# Patient Record
Sex: FEMALE | Race: WHITE | Employment: OTHER | URBAN - METROPOLITAN AREA
[De-identification: names, ages, dates, MRNs, and addresses within clinical notes are randomized per-mention and may not be internally consistent; named-entity substitution may affect disease eponyms.]

---

## 2017-02-28 PROCEDURE — 86803 HEPATITIS C AB TEST: CPT | Performed by: INTERNAL MEDICINE

## 2018-02-22 PROCEDURE — 88175 CYTOPATH C/V AUTO FLUID REDO: CPT | Performed by: OBSTETRICS & GYNECOLOGY

## 2018-07-31 PROBLEM — Z12.11 COLON CANCER SCREENING: Status: ACTIVE | Noted: 2018-07-31

## 2018-07-31 PROBLEM — Z00.00 HEALTH CARE MAINTENANCE: Status: ACTIVE | Noted: 2018-07-31

## 2020-03-12 PROBLEM — N39.41 URGE INCONTINENCE: Status: ACTIVE | Noted: 2020-03-12

## 2024-04-25 ENCOUNTER — OFFICE VISIT (OUTPATIENT)
Dept: FAMILY MEDICINE CLINIC | Facility: CLINIC | Age: 64
End: 2024-04-25
Payer: COMMERCIAL

## 2024-04-25 ENCOUNTER — LAB ENCOUNTER (OUTPATIENT)
Dept: LAB | Age: 64
End: 2024-04-25
Attending: FAMILY MEDICINE
Payer: COMMERCIAL

## 2024-04-25 VITALS
OXYGEN SATURATION: 98 % | SYSTOLIC BLOOD PRESSURE: 108 MMHG | RESPIRATION RATE: 16 BRPM | WEIGHT: 129 LBS | HEIGHT: 60 IN | BODY MASS INDEX: 25.32 KG/M2 | HEART RATE: 63 BPM | DIASTOLIC BLOOD PRESSURE: 72 MMHG

## 2024-04-25 DIAGNOSIS — Z12.31 ENCOUNTER FOR SCREENING MAMMOGRAM FOR BREAST CANCER: ICD-10-CM

## 2024-04-25 DIAGNOSIS — Z00.00 WELLNESS EXAMINATION: Primary | ICD-10-CM

## 2024-04-25 DIAGNOSIS — E03.8 OTHER SPECIFIED HYPOTHYROIDISM: ICD-10-CM

## 2024-04-25 DIAGNOSIS — Z00.00 LABORATORY EXAMINATION ORDERED AS PART OF A ROUTINE GENERAL MEDICAL EXAMINATION: ICD-10-CM

## 2024-04-25 DIAGNOSIS — E78.2 MIXED HYPERLIPIDEMIA: ICD-10-CM

## 2024-04-25 DIAGNOSIS — Z80.0 FAMILY HISTORY OF COLON CANCER: ICD-10-CM

## 2024-04-25 LAB
ALBUMIN SERPL-MCNC: 3.6 G/DL (ref 3.4–5)
ALBUMIN/GLOB SERPL: 1.2 {RATIO} (ref 1–2)
ALP LIVER SERPL-CCNC: 68 U/L
ALT SERPL-CCNC: 39 U/L
ANION GAP SERPL CALC-SCNC: 1 MMOL/L (ref 0–18)
AST SERPL-CCNC: 20 U/L (ref 15–37)
BASOPHILS # BLD AUTO: 0.03 X10(3) UL (ref 0–0.2)
BASOPHILS NFR BLD AUTO: 0.5 %
BILIRUB SERPL-MCNC: 0.8 MG/DL (ref 0.1–2)
BUN BLD-MCNC: 11 MG/DL (ref 9–23)
CALCIUM BLD-MCNC: 8.6 MG/DL (ref 8.5–10.1)
CHLORIDE SERPL-SCNC: 107 MMOL/L (ref 98–112)
CHOLEST SERPL-MCNC: 116 MG/DL (ref ?–200)
CO2 SERPL-SCNC: 33 MMOL/L (ref 21–32)
CREAT BLD-MCNC: 0.9 MG/DL
EGFRCR SERPLBLD CKD-EPI 2021: 72 ML/MIN/1.73M2 (ref 60–?)
EOSINOPHIL # BLD AUTO: 0.14 X10(3) UL (ref 0–0.7)
EOSINOPHIL NFR BLD AUTO: 2.5 %
ERYTHROCYTE [DISTWIDTH] IN BLOOD BY AUTOMATED COUNT: 12.8 %
FASTING PATIENT LIPID ANSWER: YES
FASTING STATUS PATIENT QL REPORTED: YES
GLOBULIN PLAS-MCNC: 3 G/DL (ref 2.8–4.4)
GLUCOSE BLD-MCNC: 81 MG/DL (ref 70–99)
HCT VFR BLD AUTO: 40.7 %
HDLC SERPL-MCNC: 50 MG/DL (ref 40–59)
HGB BLD-MCNC: 13.6 G/DL
IMM GRANULOCYTES # BLD AUTO: 0.01 X10(3) UL (ref 0–1)
IMM GRANULOCYTES NFR BLD: 0.2 %
LDLC SERPL CALC-MCNC: 55 MG/DL (ref ?–100)
LYMPHOCYTES # BLD AUTO: 2.63 X10(3) UL (ref 1–4)
LYMPHOCYTES NFR BLD AUTO: 46.1 %
MCH RBC QN AUTO: 31.4 PG (ref 26–34)
MCHC RBC AUTO-ENTMCNC: 33.4 G/DL (ref 31–37)
MCV RBC AUTO: 94 FL
MONOCYTES # BLD AUTO: 0.47 X10(3) UL (ref 0.1–1)
MONOCYTES NFR BLD AUTO: 8.2 %
NEUTROPHILS # BLD AUTO: 2.43 X10 (3) UL (ref 1.5–7.7)
NEUTROPHILS # BLD AUTO: 2.43 X10(3) UL (ref 1.5–7.7)
NEUTROPHILS NFR BLD AUTO: 42.5 %
NONHDLC SERPL-MCNC: 66 MG/DL (ref ?–130)
OSMOLALITY SERPL CALC.SUM OF ELEC: 290 MOSM/KG (ref 275–295)
PLATELET # BLD AUTO: 240 10(3)UL (ref 150–450)
POTASSIUM SERPL-SCNC: 3.6 MMOL/L (ref 3.5–5.1)
PROT SERPL-MCNC: 6.6 G/DL (ref 6.4–8.2)
RBC # BLD AUTO: 4.33 X10(6)UL
SODIUM SERPL-SCNC: 141 MMOL/L (ref 136–145)
TRIGL SERPL-MCNC: 44 MG/DL (ref 30–149)
TSI SER-ACNC: 1.1 MIU/ML (ref 0.36–3.74)
VLDLC SERPL CALC-MCNC: 6 MG/DL (ref 0–30)
WBC # BLD AUTO: 5.7 X10(3) UL (ref 4–11)

## 2024-04-25 PROCEDURE — 36415 COLL VENOUS BLD VENIPUNCTURE: CPT

## 2024-04-25 PROCEDURE — 80061 LIPID PANEL: CPT

## 2024-04-25 PROCEDURE — 85025 COMPLETE CBC W/AUTO DIFF WBC: CPT

## 2024-04-25 PROCEDURE — 80053 COMPREHEN METABOLIC PANEL: CPT

## 2024-04-25 PROCEDURE — 99386 PREV VISIT NEW AGE 40-64: CPT | Performed by: FAMILY MEDICINE

## 2024-04-25 PROCEDURE — 84443 ASSAY THYROID STIM HORMONE: CPT

## 2024-04-25 PROCEDURE — 96127 BRIEF EMOTIONAL/BEHAV ASSMT: CPT | Performed by: FAMILY MEDICINE

## 2024-04-25 RX ORDER — ATORVASTATIN CALCIUM 20 MG/1
20 TABLET, FILM COATED ORAL NIGHTLY
COMMUNITY
Start: 2024-03-23 | End: 2024-04-25

## 2024-04-25 RX ORDER — SODIUM FLUORIDE 1.1 G/100G
CREAM ORAL AS DIRECTED
COMMUNITY

## 2024-04-25 RX ORDER — ATORVASTATIN CALCIUM 20 MG/1
20 TABLET, FILM COATED ORAL NIGHTLY
Qty: 90 TABLET | Refills: 1 | Status: SHIPPED | OUTPATIENT
Start: 2024-04-25

## 2024-04-25 NOTE — PATIENT INSTRUCTIONS
Health Screening Guidelines, Women Ages 50 to 64   Screening tests and health counseling are a key part of managing your health. A screening test is done to find disorders or diseases in people who don't have any symptoms. Screening tests are not used to diagnose. They are used to find out if more testing is needed. The goal may be to find a disease early so it can be treated with more success. Or the goal may be to find a disease early so you can make lifestyle changes. You may need regular checkups to help you reduce your risk of disease.   Below are guidelines for women ages 50 to 64. Talk with your healthcare provider. Make sure you’re up-to-date on what you need.   Gendered terms are used here to talk about anatomy and health risk. Please use this information in a way that works best for you and your provider as you talk about your care.   Screening  Who needs it  How often    Type 2 diabetes or prediabetes  All women in this age group who are overweight or obese, or had gestational diabetes  At least every 3 years    Type 2 diabetes All women with prediabetes  Every year   Unhealthy alcohol use  All women in this age group  At routine exams   Blood pressure All women in this age group  Once a year if your blood pressure is normal. Normal blood pressure is less than 120/80 mm Hg. If your blood pressure is higher than this, follow the advice of your healthcare provider.    Breast cancer All women in this age group at average risk  Advice varies. Talk with your provider to find out what's best for you.   All women should know how their breasts normally look and feel. They should know the benefits and risks of breast cancer screening with mammograms.    Cervical cancer All women in this age group, unless they have had a complete hysterectomy  Pap test every 3 years or Pap test with high-risk human papillomavirus (hrHPV) test every 5 years    Chlamydia Women who are sexually active and at higher risk of infection   At yearly routine exams    Colorectal cancer All women in this age group at average risk  Talk with your healthcare provider about which test below is right for you:   Flexible sigmoidoscopy every 5 years  Colonoscopy every 10 years  CT colonography (virtual colonoscopy) every 5 years  Yearly fecal occult blood test  Yearly fecal immunochemical test (FIT)  Stool DNA test every 3 years  Double contrast barium enema every 5 years  If you have a test that is not a colonoscopy and have an abnormal test result, you will need a colonoscopy.   You may need to be screened more or less often. This is based on personal or family health history. Talk with your healthcare provider.    Depression All women in this age group  At routine exams   Gonorrhea Sexually active women who are at higher risk of infection  At yearly routine exams    Hepatitis C All women in this age group  At routine exams   High cholesterol or triglycerides  All women in this age group who are at risk for coronary artery disease  At least every 5 years. Talk with your healthcare provider about your risk.    HIV All women in this age group  At least 1 time. Get tested yearly if you're at high risk.    Lung cancer All women in this age group who are in fairly good health, are at higher risk for lung cancer, and who:       Smoke or quit in the past 15 years  Have a 20-pack- per year smoking history (1 pack a day for 20 years or 2 packs a day for 10 years)      Expert groups vary in their advice. Talk with your healthcare provider.  Yearly lung cancer screening with a low-dose CT scan (LDCT). Talk with your healthcare provider.    Obesity All women in this age group  At yearly routine exams    Osteoporosis Women who are postmenopausal  Talk with your healthcare provider    Syphilis Women who are at higher risk of infection. Talk with your healthcare provider.  At routine exams   Tuberculosis Women who are at higher risk of infection  Talk with your healthcare  provider    Vision All women in this age group  Talk with your healthcare provider    Health counseling Who needs it How often   BRCA gene mutation testing for breast and ovarian cancer susceptibility  Women who are at higher risk of having this gene mutation. Talk with your healthcare provider.  When your risk is known    Breast cancer and chemoprevention  Women who are at high risk for breast cancer. Talk with your healthcare provider.  When your risk is known    Diet and exercise Women who are overweight or obese  When diagnosed, and then at routine exams    Sexually transmitted infection (STI) prevention  Women who are at higher risk of infection. Talk with your healthcare provider.  At routine exams   Use of tobacco and the health effects it can cause  All women in this age group  Every exam   [employer] last reviewed this educational content on 7/1/2022 © 2000-2023 [employer]. All rights reserved. This information is not intended as a substitute for professional medical care. Always follow your healthcare professional's instructions.

## 2024-04-25 NOTE — PROGRESS NOTES
HPI:     Ronda Beaver is a 64 year old female who presents for an Annual Health Visit.      No acute concerns. Has h/o HLD on atorvastatin, hypothyroidism on levothyroxine. Reports conditions stable, denies any sx.     Allergies:   No Known Allergies    CURRENT MEDICATIONS:   Current Outpatient Medications   Medication Sig Dispense Refill    Cholecalciferol 50 MCG (2000 UT) Oral Cap Take 2,000 Units by mouth daily.      DENTA 5000 PLUS 1.1 % Dental Cream As Directed.      atorvastatin 20 MG Oral Tab Take 1 tablet (20 mg total) by mouth nightly. 90 tablet 1    levothyroxine 50 MCG Oral Tab TAKE 1 TABLET (50 MCG TOTAL) BY MOUTH DAILY. 90 tablet 3    Multiple Vitamins-Minerals (MULTI-VITAMIN/MINERALS) Oral Tab Take 1 tablet by mouth daily.        MEDICAL INFORMATION:   History reviewed. No pertinent past medical history.   Past Surgical History:   Procedure Laterality Date    Colonoscopy  11/26/2012    Total abdominal hysterectomy      Tubal ligation        Family History   Problem Relation Age of Onset    Colon Cancer Mother     Cancer Brother         Pancreatic Ca      SOCIAL HISTORY:   Social History     Socioeconomic History    Marital status:    Tobacco Use    Smoking status: Never    Smokeless tobacco: Never   Substance and Sexual Activity    Alcohol use: No    Drug use: No     Social History     Social History Narrative    Not on file        REVIEW OF SYSTEMS:     Constitutional: negative  Eyes: negative  ENT: negative  Respiratory: negative  Cardiovascular: negative  Gastrointestinal: negative  Integument/Breast: negative  Genitourinary: negative  Heme/Lymph: negative  Musculoskeletal: negative  Neurological: negative  Psych: negative  Endocrine: negative  Allergic/Immune: negative        EXAM:   /72   Pulse 63   Resp 16   Ht 5' (1.524 m)   Wt 129 lb (58.5 kg)   SpO2 98%   BMI 25.19 kg/m²    Wt Readings from Last 6 Encounters:   04/25/24 129 lb (58.5 kg)   02/14/22 135 lb (61.2 kg)    02/09/22 138 lb (62.6 kg)   01/06/22 134 lb (60.8 kg)   01/14/21 136 lb (61.7 kg)   11/14/19 133 lb (60.3 kg)     Body mass index is 25.19 kg/m².    General: alert, appears stated age, and cooperative  Head: Normocephalic, without obvious abnormality, atraumatic  Eyes: negative  Ears: normal TM's and external ear canals both ears  Nose: Nares normal. Septum midline. Mucosa normal. No drainage or sinus tenderness.  Throat: lips, mucosa, and tongue normal; teeth and gums normal  Neck: no adenopathy, supple, symmetrical, trachea midline, and thyroid not enlarged, symmetric, no tenderness/mass/nodules  Heart: S1, S2 normal, no murmur, click, rub or gallop, regular rate and rhythm  Lungs: clear to auscultation bilaterally  Breast:  deferred  Abdomen: soft, non-tender; bowel sounds normal; no masses,  no organomegaly  Pelvic: deferred  Back: negative  Extremities: extremities normal, atraumatic, no cyanosis or edema  Pulses: 2+ and symmetric  Skin: Skin color, texture, turgor normal. No rashes or lesions  Lymph Nodes:  No LAD  Neurologic: Grossly normal      ASSESSMENT AND PLAN:   Ronda was seen today for [facility] [place] care and well adult.    Diagnoses and all orders for this visit:    Wellness examination  -Immunizations: Recommend shingrex   -Metabolic: BMI 25. BP wnl. Due for annual labs   -Cancer screening: mammo ordered.    -Communicable disease: low risk   -Mental health: no concerns   -Other preventative: follow with dentistry and optometry.   -Lifestyle: Follow a well balanced healthy diet with emphasis on fruits, vegetables, whole grains, lean meats. Limit processed and junk foods. Aim for at least 150 minutes of moderate intensity exercise weekly. Make sure you are staying adequately hydrated. Aim to get 7-9 hours of sleep nightly.     Laboratory examination ordered as part of a routine general medical examination  -     CBC With Differential With Platelet; Future  -     Comp Metabolic Panel (14); Future  -      Lipid Panel; Future  -     TSH W Reflex To Free T4; Future    Encounter for screening mammogram for breast cancer  -     [facility] TOMMY 2D+3D SCREENING BILAT (CPT=77067/48231); Future    Other specified hypothyroidism  Stable, CPM. Due for labs.     Family history of colon cancer  -     Surgery Referral - In Network    Mixed hyperlipidemia  Due for labs. Continue statin, low fat diet, regular exercise.   -     atorvastatin 20 MG Oral Tab; Take 1 tablet (20 mg total) by mouth nightly.        Patient Instructions     Health Screening Guidelines, Women Ages 50 to 64   Screening tests and health counseling are a key part of managing your health. A screening test is done to find disorders or diseases in people who don't have any symptoms. Screening tests are not used to diagnose. They are used to find out if more testing is needed. The goal may be to find a disease early so it can be treated with more success. Or the goal may be to find a disease early so you can make lifestyle changes. You may need regular checkups to help you reduce your risk of disease.   Below are guidelines for women ages 50 to 64. Talk with your healthcare provider. Make sure you’re up-to-date on what you need.   Gendered terms are used here to talk about anatomy and health risk. Please use this information in a way that works best for you and your provider as you talk about your care.   Screening  Who needs it  How often    Type 2 diabetes or prediabetes  All women in this age group who are overweight or obese, or had gestational diabetes  At least every 3 years    Type 2 diabetes All women with prediabetes  Every year   Unhealthy alcohol use  All women in this age group  At routine exams   Blood pressure All women in this age group  Once a year if your blood pressure is normal. Normal blood pressure is less than 120/80 mm Hg. If your blood pressure is higher than this, follow the advice of your healthcare provider.    Breast cancer All women in this  age group at average risk  Advice varies. Talk with your provider to find out what's best for you.   All women should know how their breasts normally look and feel. They should know the benefits and risks of breast cancer screening with mammograms.    Cervical cancer All women in this age group, unless they have had a complete hysterectomy  Pap test every 3 years or Pap test with high-risk human papillomavirus (hrHPV) test every 5 years    Chlamydia Women who are sexually active and at higher risk of infection  At yearly routine exams    Colorectal cancer All women in this age group at average risk  Talk with your healthcare provider about which test below is right for you:   Flexible sigmoidoscopy every 5 years  Colonoscopy every 10 years  CT colonography (virtual colonoscopy) every 5 years  Yearly fecal occult blood test  Yearly fecal immunochemical test (FIT)  Stool DNA test every 3 years  Double contrast barium enema every 5 years  If you have a test that is not a colonoscopy and have an abnormal test result, you will need a colonoscopy.   You may need to be screened more or less often. This is based on personal or family health history. Talk with your healthcare provider.    Depression All women in this age group  At routine exams   Gonorrhea Sexually active women who are at higher risk of infection  At yearly routine exams    Hepatitis C All women in this age group  At routine exams   High cholesterol or triglycerides  All women in this age group who are at risk for coronary artery disease  At least every 5 years. Talk with your healthcare provider about your risk.    HIV All women in this age group  At least 1 time. Get tested yearly if you're at high risk.    Lung cancer All women in this age group who are in fairly good health, are at higher risk for lung cancer, and who:       Smoke or quit in the past 15 years  Have a 20-pack- per year smoking history (1 pack a day for 20 years or 2 packs a day for 10  years)      Expert groups vary in their advice. Talk with your healthcare provider.  Yearly lung cancer screening with a low-dose CT scan (LDCT). Talk with your healthcare provider.    Obesity All women in this age group  At yearly routine exams    Osteoporosis Women who are postmenopausal  Talk with your healthcare provider    Syphilis Women who are at higher risk of infection. Talk with your healthcare provider.  At routine exams   Tuberculosis Women who are at higher risk of infection  Talk with your healthcare provider    Vision All women in this age group  Talk with your healthcare provider    Health counseling Who needs it How often   BRCA gene mutation testing for breast and ovarian cancer susceptibility  Women who are at higher risk of having this gene mutation. Talk with your healthcare provider.  When your risk is known    Breast cancer and chemoprevention  Women who are at high risk for breast cancer. Talk with your healthcare provider.  When your risk is known    Diet and exercise Women who are overweight or obese  When diagnosed, and then at routine exams    Sexually transmitted infection (STI) prevention  Women who are at higher risk of infection. Talk with your healthcare provider.  At routine exams   Use of tobacco and the health effects it can cause  All women in this age group  Every exam   [employer] last reviewed this educational content on 7/1/2022 © 2000-2023 [employer]. All rights reserved. This information is not intended as a substitute for professional medical care. Always follow your healthcare professional's instructions.          The patient indicates understanding of these issues and agrees to the plan.    Problem List:  Patient Active Problem List   Diagnosis    Hyperlipidemia    Hypothyroidism    Non-toxic multinodular goiter    Postmenopausal status    Visit for screening mammogram    Health care maintenance    Colon cancer screening    Urge incontinence       Gregory  Dakotah Ortez MD  4/25/2024  3:03 PM

## 2024-05-07 ENCOUNTER — HOSPITAL ENCOUNTER (OUTPATIENT)
Dept: MAMMOGRAPHY | Age: 64
Discharge: HOME | End: 2024-05-07
Attending: FAMILY MEDICINE
Payer: COMMERCIAL

## 2024-05-07 DIAGNOSIS — Z12.31 ENCOUNTER FOR SCREENING MAMMOGRAM FOR BREAST CANCER: ICD-10-CM

## 2024-05-07 PROCEDURE — 77063 BREAST TOMOSYNTHESIS BI: CPT | Performed by: FAMILY MEDICINE

## 2024-05-07 PROCEDURE — 77067 SCR MAMMO BI INCL CAD: CPT | Performed by: FAMILY MEDICINE

## 2024-05-30 ENCOUNTER — OFFICE VISIT (OUTPATIENT)
Facility: LOCATION | Age: 64
End: 2024-05-30

## 2024-05-30 VITALS — TEMPERATURE: 97 F | HEART RATE: 67 BPM

## 2024-05-30 DIAGNOSIS — Z12.11 ENCOUNTER FOR SCREENING COLONOSCOPY: Primary | ICD-10-CM

## 2024-05-30 PROCEDURE — S0285 CNSLT BEFORE SCREEN COLONOSC: HCPCS

## 2024-05-30 RX ORDER — POLYETHYLENE GLYCOL 3350, SODIUM CHLORIDE, SODIUM BICARBONATE, POTASSIUM CHLORIDE 420; 11.2; 5.72; 1.48 G/4L; G/4L; G/4L; G/4L
POWDER, FOR SOLUTION ORAL
Qty: 1 EACH | Refills: 0 | Status: SHIPPED | OUTPATIENT
Start: 2024-05-30

## 2024-05-30 NOTE — PROGRESS NOTES
New Patient Visit Note       Active Problems      1. Encounter for screening colonoscopy        Chief Complaint   Chief Complaint   Patient presents with    Colonoscopy     NP- Cscope Consult- denies family hx of colon cancer, denies symptoms, last cscope in 2012       PCP  Gregory Ortez MD     History of Present Illness   Ronda Beaver is a 64 year old female who presents for evaluation for colonoscopy. The patient has had previous colonoscopy. Last colonoscopy per chart review showed March 2022 which showed tubular adenoma. The patient's family history is negative for colon and rectal cancer  .The patient denies nausea, vomiting, abdominal pain or cramping or bloating, diarrhea, constipation, bright red blood in the stool, dark tarry stools, other recent changes in bowel habits, or recent weight loss.    The patient's past medical history significant for hyperlipemia, hypothyroidism. She has a past surgical history significant for total abdominal hysterectomy. She is not taking any blood thinning medications.     Past Medical / Surgical / Social / Family History    The past medical history, past surgical history, family history, social history, allergies, and medications have been reviewed by me today.    Current Outpatient Medications on File Prior to Visit   Medication Sig    Cholecalciferol 50 MCG (2000 UT) Oral Cap Take 2,000 Units by mouth daily.    DENTA 5000 PLUS 1.1 % Dental Cream As Directed.    atorvastatin 20 MG Oral Tab Take 1 tablet (20 mg total) by mouth nightly.    levothyroxine 50 MCG Oral Tab TAKE 1 TABLET (50 MCG TOTAL) BY MOUTH DAILY.    Multiple Vitamins-Minerals (MULTI-VITAMIN/MINERALS) Oral Tab Take 1 tablet by mouth daily.     No current facility-administered medications on file prior to visit.        Review of Systems  Constitutional: No Fever, No Chills, No Diaphoresis, No fatigue, No weight loss, No anorexia  Eyes: No burry vision, No icterus  ENT: No tinnitus, No  rhinorrhea, No dysphagia, No odynophagia  Respiratory: No dyspnea, No wheezing  Cardiovascular: No chest pain, No palpitations, No leg swelling  Gastrointestinal: No abdominal pain, No abdominal distention, No bloating, No nausea, No vomiting, No diarrhea, No constipation, No melena, No hematochezia  Endocrine: No polydipsia, No polyuria  Genitoruinary: No dysuria, No Hematuria  Musculoskeletal: No joint pain, No muscle pain  Neurologic: No dizzyness, No syncope, No headaches, No numbness  Hematologic: No easy bruising, No easy bleeding  Psychiatric: No anxiety, No depression      Physical Findings   Pulse 67   Temp 97.4 °F (36.3 °C) (Temporal)     Constitutional: Well nourished, well kempt  Eyes: EOMI, no scleral icterus  ENT: Nares moist, oropharynx clear  Neck: Supple, no tracheal deviation   Cardiac: Normal rate, no JVD  Respiratory: No respiratory distress, No audible wheezing  Abdominal: Soft, Nontender, Nondistended  Muskuloskeletal: Normal gait, Full ROM in all extremities  Lymphatic: no cervical or supraclavicular adenopathy  Skin: No rashes,  No lesions  Neurologic: No focal deficits  Psych: Appropriate mood and affect, oriented x3            Assessment   1. Encounter for screening colonoscopy          Plan   The patient is 64 year old and has had a colonoscopy. The patient's family history is negative for colon or rectal cancer . The patient does not have gastrointestinal symptoms.     Recommend proceeding with colonoscopy with Dr. Anton Hagen.     The benefits of colonoscopy, including detection of cancer and polyp removal prior to progression to cancer were discussed. The details of the procedure which include navigation of the colonoscope through the anus, rectum, and colon to evaluate the mucosa and removal of any polyps encountered were discussed as well. The risks of colonoscopy were discussed with the patient and include but are not limited to post-procedure bleeding, infection, colorectal  perforation, splenic injury, missed polyps, need for additional surgeries or interventions. All questions were answered and the patient voiced understanding and agreed to proceed with colonoscopy.    Estella Archer PA-C

## 2024-06-24 DIAGNOSIS — E03.8 OTHER SPECIFIED HYPOTHYROIDISM: ICD-10-CM

## 2024-06-24 RX ORDER — LEVOTHYROXINE SODIUM 0.05 MG/1
TABLET ORAL
Qty: 90 TABLET | Refills: 1 | Status: SHIPPED | OUTPATIENT
Start: 2024-06-24 | End: 2024-06-24

## 2024-06-24 RX ORDER — LEVOTHYROXINE SODIUM 0.05 MG/1
TABLET ORAL
Qty: 90 TABLET | Refills: 1 | Status: SHIPPED | OUTPATIENT
Start: 2024-06-24

## 2024-06-24 NOTE — TELEPHONE ENCOUNTER
Patient called and is requesting the refill be sent to [facility] and NOT [facility] please.

## 2024-06-24 NOTE — TELEPHONE ENCOUNTER
Ronda Beaver requesting Medication Refill for: levothyroxine 50 MCG Oral Tab     Preferred Pharmacy:   [id number] - [place]   LOV: 4/25/2024     Last Refill date:   Next Scheduled appointment:     Per pt only have 7 pills left.

## 2024-07-11 ENCOUNTER — TELEPHONE (OUTPATIENT)
Facility: LOCATION | Age: 64
End: 2024-07-11

## 2024-07-11 DIAGNOSIS — Z12.11 ENCOUNTER FOR SCREENING COLONOSCOPY: Primary | ICD-10-CM

## 2024-07-22 ENCOUNTER — TELEPHONE (OUTPATIENT)
Facility: LOCATION | Age: 64
End: 2024-07-22

## 2024-07-22 NOTE — TELEPHONE ENCOUNTER
No prior authorization required for cpt code 32929. Ref #: [id number]. Spoke with King MICHELLE @ 11:51am on 7/22

## 2024-08-06 ENCOUNTER — HOSPITAL ENCOUNTER (OUTPATIENT)
Facility: HOSPITAL | Age: 64
Setting detail: HOSPITAL OUTPATIENT SURGERY
Discharge: HOME | End: 2024-08-06
Attending: STUDENT IN AN ORGANIZED HEALTH CARE EDUCATION/TRAINING PROGRAM | Admitting: STUDENT IN AN ORGANIZED HEALTH CARE EDUCATION/TRAINING PROGRAM
Payer: COMMERCIAL

## 2024-08-06 ENCOUNTER — ANESTHESIA EVENT (OUTPATIENT)
Dept: ENDOSCOPY | Facility: HOSPITAL | Age: 64
End: 2024-08-06
Payer: COMMERCIAL

## 2024-08-06 ENCOUNTER — ANESTHESIA (OUTPATIENT)
Dept: ENDOSCOPY | Facility: HOSPITAL | Age: 64
End: 2024-08-06
Payer: COMMERCIAL

## 2024-08-06 VITALS
DIASTOLIC BLOOD PRESSURE: 66 MMHG | HEART RATE: 59 BPM | RESPIRATION RATE: 16 BRPM | BODY MASS INDEX: 25.32 KG/M2 | SYSTOLIC BLOOD PRESSURE: 90 MMHG | HEIGHT: 60 IN | WEIGHT: 129 LBS | OXYGEN SATURATION: 98 % | TEMPERATURE: 97 F

## 2024-08-06 DIAGNOSIS — Z12.11 ENCOUNTER FOR SCREENING COLONOSCOPY: ICD-10-CM

## 2024-08-06 PROBLEM — Z86.0100 HISTORY OF COLON POLYPS: Status: ACTIVE | Noted: 2024-08-06

## 2024-08-06 PROBLEM — Z86.010 HISTORY OF COLON POLYPS: Status: ACTIVE | Noted: 2024-08-06

## 2024-08-06 PROBLEM — Z80.0 FAMILY HISTORY OF COLON CANCER IN MOTHER: Status: ACTIVE | Noted: 2024-08-06

## 2024-08-06 PROCEDURE — 45378 DIAGNOSTIC COLONOSCOPY: CPT | Performed by: STUDENT IN AN ORGANIZED HEALTH CARE EDUCATION/TRAINING PROGRAM

## 2024-08-06 PROCEDURE — 0DJD8ZZ INSPECTION OF LOWER INTESTINAL TRACT, VIA NATURAL OR ARTIFICIAL OPENING ENDOSCOPIC: ICD-10-PCS | Performed by: STUDENT IN AN ORGANIZED HEALTH CARE EDUCATION/TRAINING PROGRAM

## 2024-08-06 RX ORDER — HYDROMORPHONE HYDROCHLORIDE 1 MG/ML
0.6 INJECTION, SOLUTION INTRAMUSCULAR; INTRAVENOUS; SUBCUTANEOUS EVERY 5 MIN PRN
Status: DISCONTINUED | OUTPATIENT
Start: 2024-08-06 | End: 2024-08-06

## 2024-08-06 RX ORDER — LIDOCAINE HYDROCHLORIDE 10 MG/ML
INJECTION, SOLUTION EPIDURAL; INFILTRATION; INTRACAUDAL; PERINEURAL AS NEEDED
Status: DISCONTINUED | OUTPATIENT
Start: 2024-08-06 | End: 2024-08-06 | Stop reason: SURG

## 2024-08-06 RX ORDER — HYDROMORPHONE HYDROCHLORIDE 1 MG/ML
0.2 INJECTION, SOLUTION INTRAMUSCULAR; INTRAVENOUS; SUBCUTANEOUS EVERY 5 MIN PRN
Status: DISCONTINUED | OUTPATIENT
Start: 2024-08-06 | End: 2024-08-06

## 2024-08-06 RX ORDER — HYDROMORPHONE HYDROCHLORIDE 1 MG/ML
0.4 INJECTION, SOLUTION INTRAMUSCULAR; INTRAVENOUS; SUBCUTANEOUS EVERY 5 MIN PRN
Status: DISCONTINUED | OUTPATIENT
Start: 2024-08-06 | End: 2024-08-06

## 2024-08-06 RX ORDER — SODIUM CHLORIDE, SODIUM LACTATE, POTASSIUM CHLORIDE, CALCIUM CHLORIDE 600; 310; 30; 20 MG/100ML; MG/100ML; MG/100ML; MG/100ML
INJECTION, SOLUTION INTRAVENOUS CONTINUOUS
Status: DISCONTINUED | OUTPATIENT
Start: 2024-08-06 | End: 2024-08-06

## 2024-08-06 RX ORDER — PROCHLORPERAZINE EDISYLATE 5 MG/ML
5 INJECTION INTRAMUSCULAR; INTRAVENOUS EVERY 8 HOURS PRN
Status: DISCONTINUED | OUTPATIENT
Start: 2024-08-06 | End: 2024-08-06

## 2024-08-06 RX ORDER — NALOXONE HYDROCHLORIDE 0.4 MG/ML
0.08 INJECTION, SOLUTION INTRAMUSCULAR; INTRAVENOUS; SUBCUTANEOUS AS NEEDED
Status: DISCONTINUED | OUTPATIENT
Start: 2024-08-06 | End: 2024-08-06

## 2024-08-06 RX ORDER — HYDROCODONE BITARTRATE AND ACETAMINOPHEN 5; 325 MG/1; MG/1
2 TABLET ORAL ONCE AS NEEDED
Status: DISCONTINUED | OUTPATIENT
Start: 2024-08-06 | End: 2024-08-06

## 2024-08-06 RX ORDER — ONDANSETRON 2 MG/ML
4 INJECTION INTRAMUSCULAR; INTRAVENOUS EVERY 6 HOURS PRN
Status: DISCONTINUED | OUTPATIENT
Start: 2024-08-06 | End: 2024-08-06

## 2024-08-06 RX ORDER — HYDROCODONE BITARTRATE AND ACETAMINOPHEN 5; 325 MG/1; MG/1
1 TABLET ORAL ONCE AS NEEDED
Status: DISCONTINUED | OUTPATIENT
Start: 2024-08-06 | End: 2024-08-06

## 2024-08-06 RX ORDER — ACETAMINOPHEN 500 MG
1000 TABLET ORAL ONCE AS NEEDED
Status: DISCONTINUED | OUTPATIENT
Start: 2024-08-06 | End: 2024-08-06

## 2024-08-06 RX ADMIN — SODIUM CHLORIDE, SODIUM LACTATE, POTASSIUM CHLORIDE, CALCIUM CHLORIDE: 600; 310; 30; 20 INJECTION, SOLUTION INTRAVENOUS at 07:39:00

## 2024-08-06 RX ADMIN — LIDOCAINE HYDROCHLORIDE 50 MG: 10 INJECTION, SOLUTION EPIDURAL; INFILTRATION; INTRACAUDAL; PERINEURAL at 07:10:00

## 2024-08-06 NOTE — H&P
New Patient Visit Note       Active Problems      1. Encounter for screening colonoscopy        Chief Complaint   No chief complaint on file.      PCP  Gregory Ortez MD     History of Present Illness   Ronda Beaver is a 64 year old female who presents for evaluation for colonoscopy. The patient has had previous colonoscopy. Last colonoscopy per chart review showed March 2022 which showed tubular adenoma. The patient's family history is negative for colon and rectal cancer  .The patient denies nausea, vomiting, abdominal pain or cramping or bloating, diarrhea, constipation, bright red blood in the stool, dark tarry stools, other recent changes in bowel habits, or recent weight loss.    The patient's past medical history significant for hyperlipemia, hypothyroidism. She has a past surgical history significant for total abdominal hysterectomy. She is not taking any blood thinning medications.     Past Medical / Surgical / Social / Family History    The past medical history, past surgical history, family history, social history, allergies, and medications have been reviewed by me today.    No current facility-administered medications on file prior to encounter.     Current Outpatient Medications on File Prior to Encounter   Medication Sig    PEG 3350-KCl-Na Bicarb-NaCl (TRILYTE) 420 g Oral Recon Soln .trily    DENTA 5000 PLUS 1.1 % Dental Cream As Directed.    atorvastatin 20 MG Oral Tab Take 1 tablet (20 mg total) by mouth nightly.        Review of Systems  Constitutional: No Fever, No Chills, No Diaphoresis, No fatigue, No weight loss, No anorexia  Eyes: No burry vision, No icterus  ENT: No tinnitus, No rhinorrhea, No dysphagia, No odynophagia  Respiratory: No dyspnea, No wheezing  Cardiovascular: No chest pain, No palpitations, No leg swelling  Gastrointestinal: No abdominal pain, No abdominal distention, No bloating, No nausea, No vomiting, No diarrhea, No constipation, No melena, No  hematochezia  Endocrine: No polydipsia, No polyuria  Genitoruinary: No dysuria, No Hematuria  Musculoskeletal: No joint pain, No muscle pain  Neurologic: No dizzyness, No syncope, No headaches, No numbness  Hematologic: No easy bruising, No easy bleeding  Psychiatric: No anxiety, No depression      Physical Findings   /70 (BP Location: Left arm)   Pulse 59   Temp 97.4 °F (36.3 °C) (Temporal)   Resp 15   Ht 60\"   Wt 129 lb (58.5 kg)   SpO2 100%   BMI 25.19 kg/m²     Constitutional: Well nourished, well kempt  Eyes: EOMI, no scleral icterus  ENT: Nares moist, oropharynx clear  Neck: Supple, no tracheal deviation   Cardiac: Normal rate, no JVD  Respiratory: No respiratory distress, No audible wheezing  Abdominal: Soft, Nontender, Nondistended  Muskuloskeletal: Normal gait, Full ROM in all extremities  Lymphatic: no cervical or supraclavicular adenopathy  Skin: No rashes,  No lesions  Neurologic: No focal deficits  Psych: Appropriate mood and affect, oriented x3            Assessment   1. Encounter for screening colonoscopy          Plan   The patient is 64 year old and has had a colonoscopy. The patient's family history is negative for colon or rectal cancer . The patient does not have gastrointestinal symptoms.     Recommend proceeding with colonoscopy with Dr. Anton Hagen.     The benefits of colonoscopy, including detection of cancer and polyp removal prior to progression to cancer were discussed. The details of the procedure which include navigation of the colonoscope through the anus, rectum, and colon to evaluate the mucosa and removal of any polyps encountered were discussed as well. The risks of colonoscopy were discussed with the patient and include but are not limited to post-procedure bleeding, infection, colorectal perforation, splenic injury, missed polyps, need for additional surgeries or interventions. All questions were answered and the patient voiced understanding and agreed to proceed  with colonoscopy.    Estella Archer PA-C    Patient was evaluated by Estella Wilson PA-C and scheduled for surveillance colonoscopy today.  I met the patient in the preprocedure holding area.  Patient has a history of colon polyps. Patient's most recent colonoscopy was in March 2022 where two small polyps were found per patient. Patient's mother has a history of colon cancer diagnosed in her 40s.  Patient has never underwent genetic testing.  She denies any rectal bleeding, change in bowel habits, anemia or unintentional weight loss.  He does not take any blood thinners.  He completed the bowel prep as instructed.     The details of the colonoscopy procedure were discussed including the risks, benefits and alternatives.  Patient expressed understanding was agreeable to proceed as scheduled today.  Consent was signed.  All questions answered.     Anton Hagen MD  [facility]

## 2024-08-06 NOTE — OPERATIVE REPORT
[facility]  Operative Note    Ronda Beaver Location: OR   CSN [id number] MRN [id number]    1960 Age 64 year old   Admission Date 2024 Operation Date 2024   Attending Physician Anton Hagen MD Operating Physician Anton Hagen MD   PCP Gregory Ortez MD          Patient Name: Ronda Beaver    Preoperative Diagnosis: History of colon polyps, family history of colon cancer in mother    Postoperative Diagnosis:   Diverticulosis    Primary Surgeon: Anton Hagen MD    Anesthesia: MAC    Procedures: Colonoscopy to the cecum    Specimen:   None    Estimated Blood Loss: None    Complications: None immediate    Condition: Good    Indications for Surgery:   Patient was evaluated by Estella Archer PA-C on 2024 and scheduled for surveillance colonoscopy today.  I met the patient in the preprocedure holding area.  Patient has a history of colon polyps. Patient's most recent colonoscopy was in 2022 where two small polyps were found per patient. Patient's mother has a history of colon cancer diagnosed in her 40s.  Patient has never underwent genetic testing.  She denies any rectal bleeding, change in bowel habits, anemia or unintentional weight loss.  He does not take any blood thinners.  He completed the bowel prep as instructed.     The details of the colonoscopy procedure were discussed including the risks, benefits and alternatives.  Patient expressed understanding was agreeable to proceed as scheduled today.  Consent was signed.  All questions answered.    Surgical Findings:   The quality of the bowel prep was excellent.  Few, small sigmoid diverticula.  Otherwise, the entire colon and rectum appeared pink and healthy without any masses, polyps or inflammatory changes.    Description of Procedure:   The patient was taken to the endoscopy suite and positioned in the left lateral decubitus position with knees flexed. Monitored anesthesia care was administered. A time-out  was performed.    The perineum and perianal skin were examined. A digital rectal examination was performed.  These were both normal. A well-lubricated adult colonoscope was then inserted and carefully navigated to the cecum. CO2 insufflation was used throughout the procedure. Advancement was accomplished with relative ease though that the colon was tortuous.  Advancement was aided by stiffening of the colonoscope and abdominal pressure.  The appendiceal orifice and ileocecal valve were identified and photographed. The terminal ileum was not intubated. The scope was then withdrawn as the mucosa was circumferentially examined.     There were a few small sigmoid diverticula. Otherwise, the entire colon and rectum appeared pink and healthy without any masses, polyps or inflammatory changes. In the rectum, the scope was retroflexed to evaluate the anorectal junction.  The scope was straightened and excess gas was suctioned from the colon and the scope removed, terminating the procedure.    Anesthesia was terminated and the patient transported to the recovery unit in good condition. The patient tolerated the procedure well without apparent intraoperative complication.    Follow up:   Patient will need next colonoscopy in 5 years given patient's mother's history of colon cancer.       Anton Hagen MD  8/6/2024  7:39 AM

## 2024-08-06 NOTE — ANESTHESIA POSTPROCEDURE EVALUATION
[facility]    Ronda Beaver Patient Status:  Hospital Outpatient Surgery   Age/Gender 64 year old female MRN [id number]   Location [facility] Attending Anton Hagen MD   Hosp Day # 0 PCP Gregory Ortez MD       Anesthesia Post-op Note    COLONOSCOPY    Procedure Summary       Date: 08/06/24 Room / Location:  ENDOSCOPY 02 /  ENDOSCOPY    Anesthesia Start: 0706 Anesthesia Stop:     Procedure: COLONOSCOPY Diagnosis:       Encounter for screening colonoscopy      (Diverticulosis)    Surgeons: Anton Hagen MD Anesthesiologist: Arden Montoya MD    Anesthesia Type: MAC ASA Status: 2            Anesthesia Type: MAC    Vitals Value Taken Time   BP 98/63 08/06/24 0739   Temp na 08/06/24 0739   Pulse 60 08/06/24 0739   Resp 16 08/06/24 0739   SpO2 98 08/06/24 0739       Patient Location: Endoscopy    Anesthesia Type: MAC    Airway Patency: patent    Postop Pain Control: adequate    Mental Status: mildly sedated but able to meaningfully participate in the post-anesthesia evaluation    Nausea/Vomiting: none    Cardiopulmonary/Hydration status: stable euvolemic    Complications: no apparent anesthesia related complications    Postop vital signs: stable    Dental Exam: Unchanged from Preop    Patient to be discharged from PACU when criteria met.

## 2024-08-06 NOTE — ANESTHESIA PREPROCEDURE EVALUATION
PRE-OP EVALUATION    Patient Name: Ronda Beaver    Admit Diagnosis: Encounter for screening colonoscopy [Z12.11]    Pre-op Diagnosis: Encounter for screening colonoscopy [Z12.11]    COLONOSCOPY    Anesthesia Procedure: COLONOSCOPY    Surgeons and Role:     * Anton Hagen MD - Primary    Pre-op vitals reviewed.  Temp: 97.4 °F (36.3 °C)  Pulse: 59  Resp: 15  BP: 128/70  SpO2: 100 %  Body mass index is 25.19 kg/m².    Current medications reviewed.  Hospital Medications:  • lactated ringers infusion   Intravenous Continuous       Outpatient Medications:     Medications Prior to Admission   Medication Sig Dispense Refill Last Dose   • levothyroxine 50 MCG Oral Tab TAKE 1 TABLET (50 MCG TOTAL) BY MOUTH DAILY. 90 tablet 1 8/5/2024   • PEG 3350-KCl-Na Bicarb-NaCl (TRILYTE) 420 g Oral Recon Soln .trily 1 each 0 8/5/2024   • DENTA 5000 PLUS 1.1 % Dental Cream As Directed.   Past Week   • atorvastatin 20 MG Oral Tab Take 1 tablet (20 mg total) by mouth nightly. 90 tablet 1 8/5/2024       Allergies: Patient has no known allergies.      Anesthesia Evaluation    Patient summary reviewed.    Anesthetic Complications  (-) history of anesthetic complications         GI/Hepatic/Renal    Negative GI/hepatic/renal ROS.                             Cardiovascular      ECG reviewed.  Exercise tolerance: good     MET: >4         (+) hyperlipidemia                                  Endo/Other           (+) hypothyroidism                       Pulmonary    Negative pulmonary ROS.                       Neuro/Psych                                  Past Surgical History:   Procedure Laterality Date   • Colonoscopy  11/26/2012   • Total abdominal hysterectomy     • Tubal ligation       Social History     Socioeconomic History   • Marital status:    Tobacco Use   • Smoking status: Never   • Smokeless tobacco: Never   Vaping Use   • Vaping status: Never Used   Substance and Sexual Activity   • Alcohol use: No   • Drug use: No      History   Drug Use No     Available pre-op labs reviewed.               Airway      Mallampati: II  Mouth opening: >3 FB  TM distance: > 6 cm  Neck ROM: full Cardiovascular    Cardiovascular exam normal.  Rhythm: regular  Rate: normal     Dental             Pulmonary    Pulmonary exam normal.  Breath sounds clear to auscultation bilaterally.               Other findings        ASA: 2   Plan: MAC  NPO status verified and patient meets guidelines.    Post-procedure pain management plan discussed with surgeon and patient.      Plan/risks discussed with: patient            Present on Admission:  **None**

## 2024-08-06 NOTE — DISCHARGE INSTRUCTIONS
Home Care Instructions for Colonoscopy with Sedation    Diet:  - Resume your regular diet   - Start with light meals to minimize bloating.  - Do not drink alcohol today.    Medication:  - If you have questions about resuming your normal medications, please contact your Primary Care Physician.    Activities:  - Take it easy today. Do not return to work today.  - Do not drive today.  - Do not operate any machinery today (including kitchen equipment).    Colonoscopy:  - You may notice some rectal \"spotting\" (a little blood on the toilet tissue) for a day or two after the exam. This is normal.  - If you experience any rectal bleeding (not spotting), persistent tenderness or sharp severe abdominal pains, oral temperature over 100 degrees Fahrenheit, light-headedness or dizziness, or any other problems, contact your doctor.    **If unable to reach your doctor, please go to the [facility]**    - Your referring physician will receive a full report of your examination.  - If you do not hear from your doctor's office within two weeks of your biopsy, please call them for your results.

## 2024-11-29 DIAGNOSIS — E78.2 MIXED HYPERLIPIDEMIA: ICD-10-CM

## 2024-11-29 DIAGNOSIS — E03.8 OTHER SPECIFIED HYPOTHYROIDISM: ICD-10-CM

## 2024-12-02 RX ORDER — ATORVASTATIN CALCIUM 20 MG/1
20 TABLET, FILM COATED ORAL NIGHTLY
Qty: 90 TABLET | Refills: 0 | Status: SHIPPED | OUTPATIENT
Start: 2024-12-02

## 2024-12-02 RX ORDER — LEVOTHYROXINE SODIUM 50 UG/1
TABLET ORAL
Qty: 90 TABLET | Refills: 0 | Status: SHIPPED | OUTPATIENT
Start: 2024-12-02

## 2025-02-25 DIAGNOSIS — E03.8 OTHER SPECIFIED HYPOTHYROIDISM: ICD-10-CM

## 2025-02-25 RX ORDER — LEVOTHYROXINE SODIUM 50 UG/1
TABLET ORAL
Qty: 90 TABLET | Refills: 0 | OUTPATIENT
Start: 2025-02-25

## 2025-03-23 DIAGNOSIS — E78.2 MIXED HYPERLIPIDEMIA: ICD-10-CM

## 2025-03-23 DIAGNOSIS — E03.8 OTHER SPECIFIED HYPOTHYROIDISM: ICD-10-CM

## 2025-03-24 RX ORDER — LEVOTHYROXINE SODIUM 50 UG/1
TABLET ORAL
Qty: 90 TABLET | Refills: 0 | Status: SHIPPED | OUTPATIENT
Start: 2025-03-24

## 2025-03-24 RX ORDER — ATORVASTATIN CALCIUM 20 MG/1
20 TABLET, FILM COATED ORAL NIGHTLY
Qty: 90 TABLET | Refills: 0 | Status: SHIPPED | OUTPATIENT
Start: 2025-03-24

## 2025-04-30 ENCOUNTER — LAB ENCOUNTER (OUTPATIENT)
Dept: LAB | Age: 65
End: 2025-04-30
Attending: FAMILY MEDICINE
Payer: COMMERCIAL

## 2025-04-30 ENCOUNTER — OFFICE VISIT (OUTPATIENT)
Dept: FAMILY MEDICINE CLINIC | Facility: CLINIC | Age: 65
End: 2025-04-30
Payer: COMMERCIAL

## 2025-04-30 VITALS
OXYGEN SATURATION: 100 % | BODY MASS INDEX: 24.54 KG/M2 | HEIGHT: 60 IN | DIASTOLIC BLOOD PRESSURE: 72 MMHG | WEIGHT: 125 LBS | SYSTOLIC BLOOD PRESSURE: 110 MMHG | HEART RATE: 68 BPM | RESPIRATION RATE: 18 BRPM

## 2025-04-30 DIAGNOSIS — Z13.0 SCREENING FOR ENDOCRINE, METABOLIC, AND IMMUNITY DISORDER: ICD-10-CM

## 2025-04-30 DIAGNOSIS — Z00.00 LABORATORY EXAMINATION ORDERED AS PART OF A ROUTINE GENERAL MEDICAL EXAMINATION: ICD-10-CM

## 2025-04-30 DIAGNOSIS — H00.012 HORDEOLUM EXTERNUM OF RIGHT LOWER EYELID: ICD-10-CM

## 2025-04-30 DIAGNOSIS — Z13.29 SCREENING FOR ENDOCRINE, METABOLIC, AND IMMUNITY DISORDER: ICD-10-CM

## 2025-04-30 DIAGNOSIS — Z12.31 SCREENING MAMMOGRAM FOR BREAST CANCER: ICD-10-CM

## 2025-04-30 DIAGNOSIS — E78.01 FAMILIAL HYPERCHOLESTEROLEMIA: ICD-10-CM

## 2025-04-30 DIAGNOSIS — Z00.00 WELLNESS EXAMINATION: Primary | ICD-10-CM

## 2025-04-30 DIAGNOSIS — Z13.228 SCREENING FOR ENDOCRINE, METABOLIC, AND IMMUNITY DISORDER: ICD-10-CM

## 2025-04-30 DIAGNOSIS — E03.8 OTHER SPECIFIED HYPOTHYROIDISM: ICD-10-CM

## 2025-04-30 DIAGNOSIS — B35.1 ONYCHOMYCOSIS: ICD-10-CM

## 2025-04-30 LAB
ALBUMIN SERPL-MCNC: 4.6 G/DL (ref 3.2–4.8)
ALBUMIN/GLOB SERPL: 2.1 {RATIO} (ref 1–2)
ALP LIVER SERPL-CCNC: 84 U/L (ref 50–130)
ALT SERPL-CCNC: 20 U/L (ref 10–49)
ANION GAP SERPL CALC-SCNC: 6 MMOL/L (ref 0–18)
AST SERPL-CCNC: 29 U/L (ref ?–34)
BASOPHILS # BLD AUTO: 0.04 X10(3) UL (ref 0–0.2)
BASOPHILS NFR BLD AUTO: 0.6 %
BILIRUB SERPL-MCNC: 0.9 MG/DL (ref 0.2–1.1)
BUN BLD-MCNC: 14 MG/DL (ref 9–23)
CALCIUM BLD-MCNC: 9.9 MG/DL (ref 8.7–10.6)
CHLORIDE SERPL-SCNC: 107 MMOL/L (ref 98–112)
CHOLEST SERPL-MCNC: 144 MG/DL (ref ?–200)
CO2 SERPL-SCNC: 30 MMOL/L (ref 21–32)
CREAT BLD-MCNC: 0.86 MG/DL (ref 0.55–1.02)
EGFRCR SERPLBLD CKD-EPI 2021: 76 ML/MIN/1.73M2 (ref 60–?)
EOSINOPHIL # BLD AUTO: 0.14 X10(3) UL (ref 0–0.7)
EOSINOPHIL NFR BLD AUTO: 2.2 %
ERYTHROCYTE [DISTWIDTH] IN BLOOD BY AUTOMATED COUNT: 13.2 %
FASTING PATIENT LIPID ANSWER: YES
FASTING STATUS PATIENT QL REPORTED: YES
GLOBULIN PLAS-MCNC: 2.2 G/DL (ref 2–3.5)
GLUCOSE BLD-MCNC: 83 MG/DL (ref 70–99)
HCT VFR BLD AUTO: 40.9 % (ref 35–48)
HDLC SERPL-MCNC: 58 MG/DL (ref 40–59)
HGB BLD-MCNC: 13.7 G/DL (ref 12–16)
IMM GRANULOCYTES # BLD AUTO: 0.02 X10(3) UL (ref 0–1)
IMM GRANULOCYTES NFR BLD: 0.3 %
LDLC SERPL CALC-MCNC: 76 MG/DL (ref ?–100)
LYMPHOCYTES # BLD AUTO: 2.71 X10(3) UL (ref 1–4)
LYMPHOCYTES NFR BLD AUTO: 42 %
MCH RBC QN AUTO: 31.7 PG (ref 26–34)
MCHC RBC AUTO-ENTMCNC: 33.5 G/DL (ref 31–37)
MCV RBC AUTO: 94.7 FL (ref 80–100)
MONOCYTES # BLD AUTO: 0.61 X10(3) UL (ref 0.1–1)
MONOCYTES NFR BLD AUTO: 9.4 %
NEUTROPHILS # BLD AUTO: 2.94 X10 (3) UL (ref 1.5–7.7)
NEUTROPHILS # BLD AUTO: 2.94 X10(3) UL (ref 1.5–7.7)
NEUTROPHILS NFR BLD AUTO: 45.5 %
NONHDLC SERPL-MCNC: 86 MG/DL (ref ?–130)
OSMOLALITY SERPL CALC.SUM OF ELEC: 296 MOSM/KG (ref 275–295)
PLATELET # BLD AUTO: 208 10(3)UL (ref 150–450)
POTASSIUM SERPL-SCNC: 4 MMOL/L (ref 3.5–5.1)
PROT SERPL-MCNC: 6.8 G/DL (ref 5.7–8.2)
RBC # BLD AUTO: 4.32 X10(6)UL (ref 3.8–5.3)
SODIUM SERPL-SCNC: 143 MMOL/L (ref 136–145)
TRIGL SERPL-MCNC: 41 MG/DL (ref 30–149)
TSI SER-ACNC: 1.18 UIU/ML (ref 0.55–4.78)
VLDLC SERPL CALC-MCNC: 6 MG/DL (ref 0–30)
WBC # BLD AUTO: 6.5 X10(3) UL (ref 4–11)

## 2025-04-30 PROCEDURE — 36415 COLL VENOUS BLD VENIPUNCTURE: CPT

## 2025-04-30 PROCEDURE — 84443 ASSAY THYROID STIM HORMONE: CPT

## 2025-04-30 PROCEDURE — 80061 LIPID PANEL: CPT

## 2025-04-30 PROCEDURE — 80053 COMPREHEN METABOLIC PANEL: CPT

## 2025-04-30 PROCEDURE — 86787 VARICELLA-ZOSTER ANTIBODY: CPT

## 2025-04-30 PROCEDURE — 99396 PREV VISIT EST AGE 40-64: CPT | Performed by: FAMILY MEDICINE

## 2025-04-30 PROCEDURE — 85025 COMPLETE CBC W/AUTO DIFF WBC: CPT

## 2025-04-30 PROCEDURE — 99214 OFFICE O/P EST MOD 30 MIN: CPT | Performed by: FAMILY MEDICINE

## 2025-04-30 RX ORDER — TERBINAFINE HYDROCHLORIDE 250 MG/1
250 TABLET ORAL DAILY
Qty: 90 TABLET | Refills: 0 | Status: SHIPPED | OUTPATIENT
Start: 2025-04-30

## 2025-04-30 NOTE — PROGRESS NOTES
The following individual(s) verbally consented to be recorded using ambient AI listening technology and understand that they can each withdraw their consent to this listening technology at any point by asking the clinician to turn off or pause the recording:    Patient name: Ronda Beaver  Additional names:

## 2025-04-30 NOTE — PROGRESS NOTES
Subjective:   Ronda Beaver is a 65 year old female who presents for Annual (Reviewed Preventative/Wellness form with patient./) and Sty (Right lower eyelid 1 month )       History/Other:   History of Present Illness  Ronda Beaver is a 65 year old female who presents for routine physical and acute concerns.     She has had a sty on her bottom right eyelid, near the tear duct, for the past month. Initially, it was very red and sore. Over-the-counter sty medication has helped reduce the symptoms, but a small amount remains. She has not used anything else for treatment.     She reports discoloration and thickening of her toenails, describing them as 'a little bit yellow' and 'a little bit thick.' Both feet are affected. She has experienced this condition before. She has not used any specific treatment for this condition recently.    Her past medical history includes thyroid issues and cholesterol management, for which she is currently on medication. She takes her thyroid medication in the morning and her cholesterol medication at night. She reports no need for refills at this time.    She is physically active 'all day' and tries to eat healthily. She reports stable mental health.   Chief Complaint Reviewed and Verified  Nursing Notes Reviewed and   Verified  Tobacco Reviewed  Allergies Reviewed  Medications Reviewed    Medical History Reviewed  Surgical History Reviewed  OB Status Reviewed    Family History Reviewed  Social History Reviewed         Tobacco:  She has never smoked tobacco.    Current Medications[1]    PHQ-2 SCORE: 0  , done 4/30/2025     Review of Systems:  Pertinent items are noted in HPI.    Objective:   /72   Pulse 68   Resp 18   Ht 5' (1.524 m)   Wt 125 lb (56.7 kg)   SpO2 100%   BMI 24.41 kg/m²  Estimated body mass index is 24.41 kg/m² as calculated from the following:    Height as of this encounter: 5' (1.524 m).    Weight as of this encounter: 125 lb (56.7 kg).    Physical Exam  GENERAL: Alert, cooperative, well developed, no acute distress.  HEENT: Normocephalic, normal oropharynx, moist mucous membranes. Sty on lower right eyelid near tear duct.  CHEST: Clear to auscultation bilaterally, no wheezes, rhonchi, or crackles.  CARDIOVASCULAR: Normal heart rate and rhythm, S1 and S2 normal without murmurs.  ABDOMEN: Soft, non-tender, non-distended, without organomegaly, normal bowel sounds.  EXTREMITIES: No cyanosis or edema. Fungal infection on toenails, both feet.  NEUROLOGICAL: Cranial nerves grossly intact, moves all extremities without gross motor or sensory deficit.    Assessment & Plan:   1. Wellness examination (Primary)  -Immunizations: Declines shingrex, prevnar   -Metabolic: BMI 24. BP wnl. Due for annual labs   -Cancer screening: due for mammogram  -Communicable disease: low risk   -Mental health: no concerns   -Other preventative: follow with dentistry and optometry.   -Lifestyle: Follow a well balanced healthy diet with emphasis on fruits, vegetables, whole grains, lean meats. Limit processed and junk foods. Aim for at least 150 minutes of moderate intensity exercise weekly. Make sure you are staying adequately hydrated. Aim to get 7-9 hours of sleep nightly.     2. Laboratory examination ordered as part of a routine general medical examination  -     CBC With Differential With Platelet; Future; Expected date: 04/30/2025  -     Comp Metabolic Panel (14); Future; Expected date: 04/30/2025  -     Lipid Panel; Future; Expected date: 04/30/2025  -     TSH W Reflex To Free T4; Future; Expected date: 04/30/2025    3. Screening mammogram for breast cancer  -     [facility] TOMMY 2D+3D SCREENING BILAT (CPT=77067/96754); Future; Expected date: 04/30/2025    4. Other specified hypothyroidism  Stable, CPM. Due for labs.     5. Familial hypercholesterolemia  Stable, CPM. Due for labs.     6. Onychomycosis  Consistent with onychomycosis. Will start terbinafine. Reviewed medication  administration, SE.   -     Terbinafine HCl; Take 1 tablet (250 mg total) by mouth daily.  Dispense: 90 tablet; Refill: 0    7. Screening for endocrine, metabolic, and immunity disorder  -     Varicella IgG ([place]); Future; Expected date: 04/30/2025    8. Hordeolum externum of right lower eyelid  Sty improving. Warm compresses advised. Potential ophthalmologist referral if worsens.      Return in about 1 year (around 4/30/2026).        Gregory Ortez MD, 4/30/2025, 3:35 PM             [1]   Current Outpatient Medications   Medication Sig Dispense Refill    terbinafine 250 MG Oral Tab Take 1 tablet (250 mg total) by mouth daily. 90 tablet 0    ATORVASTATIN 20 MG Oral Tab Take 1 tablet by mouth nightly 90 tablet 0    LEVOTHYROXINE 50 MCG Oral Tab Take 1 tablet by mouth once daily 90 tablet 0    DENTA 5000 PLUS 1.1 % Dental Cream As Directed.      PEG 3350-KCl-Na Bicarb-NaCl (TRILYTE) 420 g Oral Recon Soln .trily (Patient not taking: Reported on 4/30/2025) 1 each 0

## 2025-04-30 NOTE — PATIENT INSTRUCTIONS
Health Screening Guidelines, Women Ages 50 to 64   Screening tests are key to managing your health. A screening test is done to find problems in people who don't have any symptoms. Screening tests are not used to diagnose. They are used to find out if more testing is needed. The goal may be to find a disease early so it can be treated with more success. Or the goal may be to find a disease early so you can make lifestyle changes.   Below are guidelines for women ages 50 to 64. Work with your healthcare provider. Make sure you’re up-to-date on what you need.   Screening  Who needs it  How often    Type 2 diabetes or prediabetes  All women in this age group who are overweight or obese, or had gestational diabetes  At least every 3 years    Type 2 diabetes All women with prediabetes  Every year   Unhealthy alcohol use  All women in this age group  At routine exams   Blood pressure All women in this age group  Once a year if your blood pressure is normal. Normal blood pressure is less than 120/80 mm Hg. If your blood pressure is higher than this, follow the advice of your healthcare provider.    Breast cancer All women in this age group at average risk. Expert groups vary on their advice so talk with your provider about your specific situation.      A mammogram is advised every 1 or 2 years. Talk with your provider about your risk factors. Ask how often you need one.       The [place] Preventive Services Task Force advises a mammogram every 2 years starting at age 40.  The American Cancer Society advises yearly mammograms for women through ages 45 to 54 and mammograms every 1 to 2 years for women ages 55 and older.      All women should know how their breasts normally look and feel. They should know the benefits and risks of breast cancer screening with mammograms.    Cervical cancer All women in this age group, unless they have had a complete hysterectomy  Primary HPV test every 5 years, a co-test (an HPV test with a  Pap test) every 5 years, or a Pap test every 3 years. Talk with your healthcare provider about your risks and whether you need screening more often.    Chlamydia Women who are sexually active and at higher risk of infection  At yearly routine exams    Colorectal cancer All women in this age group at average risk  Talk with your healthcare provider about which test below is right for you:   Flexible sigmoidoscopy every 5 years  Colonoscopy every 10 years  CT colonography (virtual colonoscopy) every 5 years  Yearly fecal occult blood test  Yearly fecal immunochemical test (FIT)  Stool DNA with FIT test every 3 years  If you have a test that is not a colonoscopy and have an abnormal test result, you will need a colonoscopy.   You may need to be screened more or less often. This is based on personal or family health history. Talk with your healthcare provider.    Depression All adults At routine exams, including, all pregnant and postpartum women    Gonorrhea Sexually active women who are at higher risk of infection  At yearly routine exams    Hepatitis C All adults At routine exams   High cholesterol or triglycerides  All adults     At least every 5 years up to age 55.  Women ages 55 to 65 should be screened every 1 to 2 years.  Talk with your healthcare provider about your risk and how often to get screened.    HIV All adults At least once between the ages of 13 and 64. Women at ongoing risk should be screened more often. Talk with your healthcare provider about your risk and how often to be screened.    Lung cancer All women in this age group who are in fairly good health, are at higher risk for lung cancer, and who:       Smoke or used to smoke  Have a 20-pack- per year smoking history (1 pack a day for 20 years or 2 packs a day for 10 years)      Expert groups vary in their advice. Talk with your healthcare provider.  Yearly lung cancer screening with a low-dose CT scan (LDCT). Talk with your healthcare provider.     Obesity All adults At routine exams   Osteoporosis Women who are postmenopausal  Talk with your healthcare provider.    Syphilis Women who are at higher risk of infection.  Talk with your healthcare provider.    Tuberculosis Women who are at higher risk of infection  Talk with your healthcare provider.    Vision All adults At least every 1 to 2 years or as directed by your healthcare provider.    Health counseling Who needs it How often   BRCA gene mutation testing for breast and ovarian cancer susceptibility  Women who are at higher risk of having this gene mutation. Talk with your healthcare provider.  When your risk is known    Breast cancer and chemoprevention  Women who are at high risk for breast cancer. Talk with your healthcare provider.  When your risk is known    Diet and exercise Women who are overweight or obese  When diagnosed, and then at routine exams    Sexually transmitted infection (STI) prevention  Women who are at higher risk of infection. Talk with your healthcare provider.  At routine exams   Use of tobacco and the health effects it can cause  All adults Every exam   Paulo last reviewed this educational content on 5/1/2024  This information is for informational purposes only. This is not intended to be a substitute for professional medical advice, diagnosis, or treatment. Always seek the advice and follow the directions from your physician or other qualified health care provider.  © 4675-5196 [employer]. All rights reserved. This information is not intended as a substitute for professional medical care. Always follow your healthcare professional's instructions.

## 2025-05-02 LAB — VZV IGG SER IA-ACNC: 0.17 (ref 1–?)

## 2025-05-12 ENCOUNTER — HOSPITAL ENCOUNTER (OUTPATIENT)
Dept: MAMMOGRAPHY | Age: 65
Discharge: HOME | End: 2025-05-12
Attending: FAMILY MEDICINE
Payer: COMMERCIAL

## 2025-05-12 ENCOUNTER — TELEPHONE (OUTPATIENT)
Dept: FAMILY MEDICINE CLINIC | Facility: CLINIC | Age: 65
End: 2025-05-12

## 2025-05-12 DIAGNOSIS — Z12.31 SCREENING MAMMOGRAM FOR BREAST CANCER: ICD-10-CM

## 2025-05-12 PROCEDURE — 77067 SCR MAMMO BI INCL CAD: CPT | Performed by: FAMILY MEDICINE

## 2025-05-12 PROCEDURE — 77063 BREAST TOMOSYNTHESIS BI: CPT | Performed by: FAMILY MEDICINE

## 2025-05-12 NOTE — TELEPHONE ENCOUNTER
Received call from Mary with [facility], calling to see if we received fax for medication change request. Correct fax confirmed. Informed her I will get message to medical assistants to see if fax has been received. Per Mary, please have provider review/sign and then fax back.     MA's- has fax been received for pt? If not, please call to have it refaxed.

## 2025-05-15 ENCOUNTER — MED REC SCAN ONLY (OUTPATIENT)
Dept: FAMILY MEDICINE CLINIC | Facility: CLINIC | Age: 65
End: 2025-05-15

## 2025-06-17 DIAGNOSIS — E03.8 OTHER SPECIFIED HYPOTHYROIDISM: ICD-10-CM

## 2025-06-18 RX ORDER — LEVOTHYROXINE SODIUM 50 UG/1
50 TABLET ORAL DAILY
Qty: 90 TABLET | Refills: 3 | Status: SHIPPED | OUTPATIENT
Start: 2025-06-18

## 2025-06-21 DIAGNOSIS — E03.8 OTHER SPECIFIED HYPOTHYROIDISM: ICD-10-CM

## 2025-06-24 RX ORDER — LEVOTHYROXINE SODIUM 50 UG/1
50 TABLET ORAL DAILY
Qty: 90 TABLET | Refills: 0 | OUTPATIENT
Start: 2025-06-24

## (undated) DEVICE — KIT CUSTOM ENDOPROCEDURE STERIS

## (undated) DEVICE — 3M™ RED DOT™ MONITORING ELECTRODE WITH FOAM TAPE AND STICKY GEL 2570-3, 3/BAG, 200/CASE, 54/PLT: Brand: RED DOT™

## (undated) DEVICE — FILTERLINE NASAL ADULT O2/CO2

## (undated) DEVICE — 1200CC GUARDIAN II: Brand: GUARDIAN

## (undated) DEVICE — KIT MFLD FOR SPEC COLL

## (undated) DEVICE — VALVE AIR/H20 DEFENDO SCT BX

## (undated) NOTE — LETTER
Patient Name: Ronda Beaver  Surgery Date: 7/30/2024  Medical Record: [id number] CSN: [id number]      Surgeon(s):  Anton Hagen MD  Consent Procedure: COLONOSCOPY  Anesthesia Type: MAC    Pre admission testing has reached out to this patient for a pre op screening but patient verbalized that this is supposed to be on 8/6/24. Please call pt to follow up re: this matter.    Thank you  GABBY Graves